# Patient Record
Sex: MALE | Race: WHITE | NOT HISPANIC OR LATINO | URBAN - METROPOLITAN AREA
[De-identification: names, ages, dates, MRNs, and addresses within clinical notes are randomized per-mention and may not be internally consistent; named-entity substitution may affect disease eponyms.]

---

## 2023-01-27 PROBLEM — K62.5 RECTAL BLEEDING: Status: ACTIVE | Noted: 2023-01-27

## 2023-01-27 PROBLEM — E03.9 HYPOTHYROIDISM: Status: ACTIVE | Noted: 2023-01-27

## 2023-01-27 PROBLEM — I10 BP (HIGH BLOOD PRESSURE): Status: ACTIVE | Noted: 2023-01-27

## 2023-01-27 PROBLEM — E66.01 MORBID OBESITY (MULTI): Status: ACTIVE | Noted: 2023-01-27

## 2023-01-27 PROBLEM — E53.8 FOLIC ACID DEFICIENCY: Status: ACTIVE | Noted: 2023-01-27

## 2023-01-27 PROBLEM — D50.9 ANEMIA, IRON DEFICIENCY: Status: ACTIVE | Noted: 2023-01-27

## 2023-01-27 PROBLEM — I48.0 PAROXYSMAL ATRIAL FIBRILLATION (MULTI): Status: ACTIVE | Noted: 2023-01-27

## 2023-01-27 PROBLEM — R31.29 HEMATURIA, MICROSCOPIC: Status: ACTIVE | Noted: 2023-01-27

## 2023-01-27 PROBLEM — E78.5 HYPERLIPIDEMIA: Status: ACTIVE | Noted: 2023-01-27

## 2023-01-27 PROBLEM — E53.8 VITAMIN B12 DEFICIENCY: Status: ACTIVE | Noted: 2023-01-27

## 2023-01-27 PROBLEM — E55.9 VITAMIN D DEFICIENCY: Status: ACTIVE | Noted: 2023-01-27

## 2023-01-27 PROBLEM — R41.3 MEMORY LOSS: Status: ACTIVE | Noted: 2023-01-27

## 2023-01-27 PROBLEM — D12.6 TUBULAR ADENOMA OF COLON: Status: ACTIVE | Noted: 2023-01-27

## 2023-01-27 PROBLEM — D64.9 ANEMIA, UNSPECIFIED: Status: ACTIVE | Noted: 2023-01-27

## 2023-01-27 RX ORDER — WITCH HAZEL 50 %
1 PADS, MEDICATED (EA) TOPICAL DAILY
COMMUNITY

## 2023-01-27 RX ORDER — PYRIDOXINE HCL (VITAMIN B6) 100 MG
1 TABLET ORAL DAILY
COMMUNITY

## 2023-01-27 RX ORDER — LOVASTATIN 40 MG/1
1 TABLET ORAL NIGHTLY
COMMUNITY
Start: 2017-01-31 | End: 2023-08-08 | Stop reason: SDUPTHER

## 2023-01-27 RX ORDER — DILTIAZEM HYDROCHLORIDE 120 MG/1
1 CAPSULE, COATED, EXTENDED RELEASE ORAL 2 TIMES DAILY
COMMUNITY
Start: 2017-02-21 | End: 2023-09-07 | Stop reason: SDUPTHER

## 2023-01-27 RX ORDER — LEVOTHYROXINE SODIUM 100 UG/1
1 TABLET ORAL DAILY
COMMUNITY
Start: 2013-12-10 | End: 2023-10-25 | Stop reason: SDUPTHER

## 2023-03-12 PROBLEM — I48.0 PAROXYSMAL ATRIAL FIBRILLATION (MULTI): Status: RESOLVED | Noted: 2023-01-27 | Resolved: 2023-03-12

## 2023-03-12 PROBLEM — E78.5 HYPERLIPIDEMIA: Status: RESOLVED | Noted: 2023-01-27 | Resolved: 2023-03-12

## 2023-03-12 PROBLEM — E03.9 HYPOTHYROIDISM: Status: RESOLVED | Noted: 2023-01-27 | Resolved: 2023-03-12

## 2023-03-12 PROBLEM — D64.9 ANEMIA, UNSPECIFIED: Status: RESOLVED | Noted: 2023-01-27 | Resolved: 2023-03-12

## 2023-03-20 ENCOUNTER — TELEPHONE (OUTPATIENT)
Dept: PRIMARY CARE | Facility: CLINIC | Age: 83
End: 2023-03-20
Payer: MEDICARE

## 2023-03-28 ENCOUNTER — TELEMEDICINE (OUTPATIENT)
Dept: PRIMARY CARE | Facility: CLINIC | Age: 83
End: 2023-03-28
Payer: MEDICARE

## 2023-03-28 DIAGNOSIS — E66.9 OBESITY (BMI 30-39.9): ICD-10-CM

## 2023-03-28 DIAGNOSIS — R41.3 MEMORY LOSS: Primary | ICD-10-CM

## 2023-03-28 DIAGNOSIS — I48.0 PAROXYSMAL ATRIAL FIBRILLATION (MULTI): ICD-10-CM

## 2023-03-28 DIAGNOSIS — I10 HYPERTENSION, UNSPECIFIED TYPE: ICD-10-CM

## 2023-03-28 PROCEDURE — 99212 OFFICE O/P EST SF 10 MIN: CPT | Performed by: INTERNAL MEDICINE

## 2023-04-02 DIAGNOSIS — I48.0 PAROXYSMAL ATRIAL FIBRILLATION (MULTI): ICD-10-CM

## 2023-04-02 PROBLEM — Z13.89 SCREENING FOR OBESITY: Status: ACTIVE | Noted: 2023-04-02

## 2023-04-02 PROBLEM — E66.9 OBESITY (BMI 30-39.9): Status: ACTIVE | Noted: 2023-01-27

## 2023-04-02 NOTE — PROGRESS NOTES
Subjective   Patient ID: Abel Almodovar is a 82 y.o. male who presents for virtual visit (Discuss memory loss).    Memory Loss    Patient reports onset of memory loss was more than 1 year ago. Onset quality is gradual.     Symptoms associated with memory loss include changes in short-term memory, difficulty recalling words and repetitive questions. Symptoms do not include day/night behavior changes.    Behavorial problems for memory loss include suspiciousness. Patient does not have the following behavorial problems associated with memory loss: paranoia, hallucinations, delusions or agitation.    The family and/or patient does not have the following concerns associated with memory loss: medication errors, wandering, driving, cooking or preparing meals.    The patient manages own medication regimen.     Patient lives with spouse or partner. Patient lives in a/an own home.       Review of Systems:  Constitutional: No fever or chills  Cardiovascular: no chest pain, no palpitations and no syncope.   Respiratory: no cough, no shortness of breath during exertion and no shortness of breath at rest.   Gastrointestinal: no abdominal pain, no nausea and no vomiting.  Neuro: No Headache, no dizziness    Physical Exam:   Constitutional: Alert and in no acute distress. Well developed, well nourished.   Head and Face: Head and face: Normal.     Eyes: Normal external exam.    Ears, Nose, Mouth, and Throat: External inspection of ears and nose: Normal.  Hearing: Normal.   Neck: No neck mass was observed. Supple.  Pulmonary: No respiratory distress.    Musculoskeletal: Range of motion: Normal.     Skin: Normal skin color and pigmentation, normal skin turgor, and no rash.    Neurologic: Coordination: Normal.    Psychiatric: Judgment and insight: Intact. Mood and affect: Normal.    Lab Results   Component Value Date    WBC 8.8 03/20/2023    HGB 12.5 (L) 03/20/2023    HCT 36.5 (L) 03/20/2023     03/20/2023    CHOL 152 09/12/2022     TRIG 70 05/07/2021    HDL 40.3 09/12/2022    ALT 18 03/20/2023    AST 25 03/20/2023     (L) 03/20/2023    K 4.0 03/20/2023    CL 95 (L) 03/20/2023    CREATININE 0.89 03/20/2023    BUN 14 03/20/2023    CO2 23 03/20/2023    TSH 3.64 03/20/2023    PSA 1.08 09/28/2020    HGBA1C 5.4 11/17/2021       CT head wo IV contrast  Narrative: Interpreted By:  GOLDY ROMAN MD and KEATON SMITH MD  MRN: [id number]  Patient Name: LEONIE JIMENEZ     STUDY:  CT HEAD WO CONTRAST;  3/20/2023 9:50 pm     INDICATION:  AMS without focality, Lie Flat: Yes .     COMPARISON:  MRI brain 06/05/2011  CT head 05/24/2011     ACCESSION NUMBER(S):  [id number]     ORDERING CLINICIAN:  AINSLEY ROHAN     TECHNIQUE:  Noncontrast axial CT scan of head was performed. Angled reformats in  brain and bone windows were generated. The images were reviewed in  bone, brain, blood and soft tissue windows.     FINDINGS:  CSF Spaces: Global parenchymal volume loss. No hydrocephalus. Basal  cisterns are patent and without effacement. There is no extraaxial  fluid collection.     Parenchyma: Patchy and confluent subcortical periventricular white  matter hypodensities are nonspecific but likely represent sequelae of  chronic microvascular disease. The grey-white differentiation is  otherwise intact, no evidence of acute transcortical infarct. There  is no mass effect or midline shift.  There is no intracranial  hemorrhage.     Calvarium: The calvarium is unremarkable.     Paranasal sinuses, orbits and mastoids: Bilateral lens replacement.     Impression: 1. No acute intracranial abnormality.        I personally reviewed the images/study and I agree with the findings  as stated. This study was interpreted at [facility], [place], [place].  Electrocardiogram 12 Lead  Please see ED Provider Note for formal interpretation  Confirmed by Kenny Wright ([id number]) on 3/20/2023 10:23:37 PM      Assessment/Plan   Diagnoses and all  orders for this visit:  Memory loss  -     Referral to Geriatrics; Future  Hypertension, unspecified type  Paroxysmal atrial fibrillation (CMS/HCC)  Obesity (BMI 30-39.9)    1.       She has significant memory loss I placed a referral to have him seen by geriatrics referral placed  I told him he will be called with the appointment    This Medical recommendation has been made based on the Telephonic/Video conversation and the history is given by the patient, which I as a Physician and the patient also understand that there are limitations given the lack of an in-person Physical Exam. Patient will call back if symptoms don't improve.    Your yearly Physical is due in:   When you call the office for your yearly Physical, please ask them to inform me to order your blood work, so that you can get the fasting blood work before your appointment and we can discuss the results at your physical.      A Doctor is always available by phone when the office is closed. Please feel free to call for help with any problem that you feel shouldn't wait until the office re-opens.     Kavya Botello MD

## 2023-04-27 LAB
ANION GAP IN SER/PLAS: 14 MMOL/L (ref 10–20)
CALCIDIOL (25 OH VITAMIN D3) (NG/ML) IN SER/PLAS: 33 NG/ML
CALCIUM (MG/DL) IN SER/PLAS: 9.4 MG/DL (ref 8.6–10.6)
CARBON DIOXIDE, TOTAL (MMOL/L) IN SER/PLAS: 24 MMOL/L (ref 21–32)
CHLORIDE (MMOL/L) IN SER/PLAS: 101 MMOL/L (ref 98–107)
COBALAMIN (VITAMIN B12) (PG/ML) IN SER/PLAS: 389 PG/ML (ref 211–911)
CREATININE (MG/DL) IN SER/PLAS: 0.86 MG/DL (ref 0.5–1.3)
ERYTHROCYTE DISTRIBUTION WIDTH (RATIO) BY AUTOMATED COUNT: 14.1 % (ref 11.5–14.5)
ERYTHROCYTE MEAN CORPUSCULAR HEMOGLOBIN CONCENTRATION (G/DL) BY AUTOMATED: 33 G/DL (ref 32–36)
ERYTHROCYTE MEAN CORPUSCULAR VOLUME (FL) BY AUTOMATED COUNT: 93 FL (ref 80–100)
ERYTHROCYTES (10*6/UL) IN BLOOD BY AUTOMATED COUNT: 4.16 X10E12/L (ref 4.5–5.9)
FOLATE (NG/ML) IN SER/PLAS: 10.1 NG/ML
GFR MALE: 87 ML/MIN/1.73M2
GLUCOSE (MG/DL) IN SER/PLAS: 107 MG/DL (ref 74–99)
HEMATOCRIT (%) IN BLOOD BY AUTOMATED COUNT: 38.5 % (ref 41–52)
HEMOGLOBIN (G/DL) IN BLOOD: 12.7 G/DL (ref 13.5–17.5)
LEUKOCYTES (10*3/UL) IN BLOOD BY AUTOMATED COUNT: 8 X10E9/L (ref 4.4–11.3)
NRBC (PER 100 WBCS) BY AUTOMATED COUNT: 0 /100 WBC (ref 0–0)
PLATELETS (10*3/UL) IN BLOOD AUTOMATED COUNT: 200 X10E9/L (ref 150–450)
POTASSIUM (MMOL/L) IN SER/PLAS: 4.5 MMOL/L (ref 3.5–5.3)
SODIUM (MMOL/L) IN SER/PLAS: 134 MMOL/L (ref 136–145)
UREA NITROGEN (MG/DL) IN SER/PLAS: 21 MG/DL (ref 6–23)

## 2023-08-08 DIAGNOSIS — E78.49 OTHER HYPERLIPIDEMIA: ICD-10-CM

## 2023-08-08 RX ORDER — LOVASTATIN 40 MG/1
40 TABLET ORAL NIGHTLY
Qty: 100 TABLET | Refills: 0 | Status: SHIPPED | OUTPATIENT
Start: 2023-08-08 | End: 2023-09-01 | Stop reason: SDUPTHER

## 2023-09-01 DIAGNOSIS — I48.0 PAROXYSMAL ATRIAL FIBRILLATION (MULTI): ICD-10-CM

## 2023-09-01 DIAGNOSIS — E78.49 OTHER HYPERLIPIDEMIA: ICD-10-CM

## 2023-09-01 RX ORDER — LOVASTATIN 40 MG/1
40 TABLET ORAL NIGHTLY
Qty: 100 TABLET | Refills: 0 | Status: SHIPPED | OUTPATIENT
Start: 2023-09-01 | End: 2023-10-24 | Stop reason: SDUPTHER

## 2023-09-05 NOTE — TELEPHONE ENCOUNTER
Spoke with patient and advised of message   Appt. Schd. For 9/22/23. Pt. Also asking if he should continue with [facility]. If so he will need a refill.

## 2023-09-08 RX ORDER — DILTIAZEM HYDROCHLORIDE 120 MG/1
120 CAPSULE, COATED, EXTENDED RELEASE ORAL 2 TIMES DAILY
Qty: 180 CAPSULE | Refills: 2 | Status: SHIPPED | OUTPATIENT
Start: 2023-09-08

## 2023-10-05 DIAGNOSIS — I48.0 PAROXYSMAL ATRIAL FIBRILLATION (MULTI): ICD-10-CM

## 2023-10-16 ENCOUNTER — TELEPHONE (OUTPATIENT)
Dept: GERIATRIC MEDICINE | Facility: CLINIC | Age: 83
End: 2023-10-16
Payer: MEDICARE

## 2023-10-16 NOTE — TELEPHONE ENCOUNTER
Agnes Clemons CNP, requested an updated from Jose,  for APS. I called Jose and he shared [facility]. Patient has misplaced his SSA card and birth certificate which has created a challenge in supporting patient applying for Medicaid coverage. [facility] hospital staff has shared intent to allow patient to move in while the financials are being worked out.

## 2023-10-18 ENCOUNTER — APPOINTMENT (OUTPATIENT)
Dept: GERIATRIC MEDICINE | Facility: CLINIC | Age: 83
End: 2023-10-18
Payer: MEDICARE

## 2023-10-18 ENCOUNTER — TELEPHONE (OUTPATIENT)
Dept: GERIATRIC MEDICINE | Facility: CLINIC | Age: 83
End: 2023-10-18
Payer: MEDICARE

## 2023-10-18 NOTE — TELEPHONE ENCOUNTER
Update: Patient left a message on the nurse line stating he will be moving to [facility].

## 2023-10-18 NOTE — PROGRESS NOTES
Subjective   Mr. Almodovar is 83 y.o. year old male and here for f/u of No chief complaint on file.. Here with self.    Last [id number]  Per pt discussion/summary: reviewed    HPI   Per patient and caregiver (obtained in addition due to ***)      Home environment: alone in apt, has cat   Alcohol:n, former  Smoking:n ,former    Is dependent or requires assistance in the following BADL: NONE    Is dependent or requires assistance in the following Instrumental ADL: finances transport shopping laundry , has MOW   Med box yes  History of Abuse/Neglect/exploitation: n  AD: has living willand POA      Review of Systems    Objective   There were no vitals taken for this visit.    Physical Exam      Assessment/Plan   82yo here for fu visit     Major neurocog disorder, self care deficits  - no major lab issues, head CT reviewed in past  -Being followed by APS, worker Jose has been in touch with waiver for assisted living and will request repeat assessment to be done  - Has apartment available at [facility] for financial has yet to be resolved  - Patient with increasing difficulty trying to fill medications  - Patient needs 24/7 supervision for his safety    Depression  - start on escitalopram , last qtc 3/2023  -Weight has been stable now actually gaining a little  - Appears much less jittery and nervous during appointment    HTN  - well controlled on current meds    AF with LTAC  - on eliquis 5 mg bid  - no sx of bleeding    HLD  - on lovastatin, consider dropping dose if his lipid panel is good    hypothyroidism  - on levothyroxine, last labs were stable in March 2023    Self care deficit  - unable to go shopping as he cannot drive, groceries delivered and also has ESTEFANI JACOB in contact with APS and assess status of move.   - disheveled clothing   - would benefit from AL setting     dispo: Continues on escitalopram, APS worker still working on assisted living placement. Patient return in 1 month unless he has moved by then.

## 2023-10-24 DIAGNOSIS — E78.49 OTHER HYPERLIPIDEMIA: ICD-10-CM

## 2023-10-24 DIAGNOSIS — E03.9 HYPOTHYROIDISM, UNSPECIFIED TYPE: ICD-10-CM

## 2023-10-24 RX ORDER — LOVASTATIN 40 MG/1
40 TABLET ORAL NIGHTLY
Qty: 100 TABLET | Refills: 0 | Status: SHIPPED | OUTPATIENT
Start: 2023-10-24

## 2023-10-26 RX ORDER — LEVOTHYROXINE SODIUM 100 UG/1
100 TABLET ORAL DAILY
Qty: 100 TABLET | Refills: 1 | Status: SHIPPED | OUTPATIENT
Start: 2023-10-26

## 2023-10-31 ENCOUNTER — APPOINTMENT (OUTPATIENT)
Dept: PRIMARY CARE | Facility: CLINIC | Age: 83
End: 2023-10-31
Payer: MEDICARE

## 2023-10-31 ENCOUNTER — TELEPHONE (OUTPATIENT)
Dept: GERIATRIC MEDICINE | Facility: CLINIC | Age: 83
End: 2023-10-31

## 2023-10-31 NOTE — TELEPHONE ENCOUNTER
Spoke with Patient and he wanted to update you that he is making arrangements to move to [facility] in the next few days. States she has a friend that lives there.

## 2023-11-01 ENCOUNTER — APPOINTMENT (OUTPATIENT)
Dept: PRIMARY CARE | Facility: CLINIC | Age: 83
End: 2023-11-01
Payer: MEDICARE

## 2023-11-01 ENCOUNTER — APPOINTMENT (OUTPATIENT)
Dept: GERIATRIC MEDICINE | Facility: CLINIC | Age: 83
End: 2023-11-01
Payer: MEDICARE

## 2024-04-09 ENCOUNTER — TELEPHONE (OUTPATIENT)
Dept: PHARMACY | Facility: HOSPITAL | Age: 84
End: 2024-04-09
Payer: MEDICARE

## 2024-04-09 NOTE — TELEPHONE ENCOUNTER
Population Health: Outreach by Ambulatory Pharmacy Team    Payor: Jennifer AMBRIZ  Reason: Adherence  Medication: lovastatin 40 mg   Outcome: No Answer/Invalid Number    Additional Notes: Unable to reach; both patient phone numbers disconnected. Were attempting to get patient into a Skilled Nursing Facility at [facility] per visit note 08/30/2023. Last filled x 30 days with [facility] on 02/13/2024.   Savannah Greene, PharmD